# Patient Record
(demographics unavailable — no encounter records)

---

## 2024-10-14 NOTE — HISTORY OF PRESENT ILLNESS
[Neck] : neck [Lower back] : lower back [6] : 6 [0] : 0 [Burning] : burning [Intermittent] : intermittent [Household chores] : household chores [Nothing helps with pain getting better] : Nothing helps with pain getting better [Walking] : walking [Retired] : Work status: retired [FreeTextEntry1] : 10/14/2024 : Patient presents for initial evaluation. Patient with neck and back pain for the last year or so. Pain  has gotten worse recently.  Pian over the base of the neck and lower back without radiation. He is currently in PT. can not take NSAIDS due to plavix and Chrohns disease.     PmHx: Parkinson's Dz, Crohns s/p colectomy in 2020, Sleep disorder, HTN, CAD s/p stent placement 2023 on plavix and asa 81mg daily, HLD, BPH  Subjective Weakness: Yes-  strength  Numbness/Tingling: No Bladder/Bowel dysfunction: No Treatments Tried: PT Blood Thinners: Plavix, ASA   Attempted modalities for current pain complaint: See above: Medications: No   Injections: No   Previous Spine Surgery: N/A   Imaging: MRI Cervical Spine (7/3024) Zp: C2-C3: Mild spondylosis. Bilateral Luschka joint hypertrophy. No disc herniation, central canal, or foraminal stenosis.  C3-C4: Moderate to severe spondylosis with posterior disc space narrowing. Bilateral Luschka joint hypertrophy combines with a broad-based posterior central and left paracentral mixed spondylotic small disc protrusion that indents the anterior thecal sac. This produces a mild to minimal central canal stenosis. The disc material encroaches the floor of the left neural foramen producing a left foraminal stenosis. There is mild impingement of the exiting left C4 nerve root.  C4-C5: Moderate to severe spondylosis with posterior disc space narrowing. Broad-based posterior central mixed spondylotic small disc protrusion indents the anterior thecal sac. This produces a mild central canal stenosis. There is ----- Page Break ----- bilateral facet osteoarthrosis. No substantive neural foraminal stenosis.  C5-C6: Moderate spondylosis with posterior disc space narrowing. Bilateral Luschka joint hypertrophy combines with a broad-based posterior central small subligamentous disc protrusion that indents the anterior thecal sac. This produces a mild central canal stenosis. Vertebral endplate spur encroachment into the left neural foramen produces a left foraminal stenosis. There is impingement of the exiting left C6 nerve root.  C6-C7: Severe spondylosis with disc space narrowing and Modic type II degenerative endplate changes. Bilateral Luschka joint hypertrophy combines with a broad-based posterior central mixed spondylotic small disc protrusion that indents the anterior thecal sac. This produces a mild central canal stenosis. There is mild facet osteoarthrosis. Vertebral endplate spur encroachment into the neural foramina is noted bilaterally, worse on the left for a left greater than right neural foraminal stenosis.  C7-T1: Mild spondylosis. Bilateral Luschka joint hypertrophy combines with a broad-based posterior central small subligamentous disc protrusion that indents the anterior thecal sac. There is bilateral facet osteoarthrosis. No evidence MRI lumbar spine: 7/30/24: zp: Impingement of the descending left intrathecal L3 nerve root at L2-L3.  Impingement of the exiting left L3 nerve root at L3-L4.  Impingement of the bilateral exiting L4 nerve roots at L3-L4.  Multilevel central canal and neural foraminal stenosis on the basis of multilevel spondylosis, scoliosis, listhesis, facet osteoarthrosis, and degenerative disc bulging/protrusions as described and positioned above the  findings of which appear to be chronic in nature.   [] : no [de-identified] : L [facility]ST[facility] C MRI AT [facility]

## 2024-10-14 NOTE — PHYSICAL EXAM
[Rotation to left] : rotation to left [4___] : left finger abductors 4[unfilled]/5 [de-identified] : left lateral rotation 45 degrees [TWNoteComboBox6] : right lateral rotation 60 degrees [NL (90)] : forward flexion 90 degrees [Extension] : extension [] : negative Choe maneuver facet loading [de-identified] : extension 20 degrees

## 2024-10-14 NOTE — ASSESSMENT
[FreeTextEntry1] : After discussing various treatment options with the patient including but not limited to oral medications, physical therapy, exercise, modalities as well as interventional spinal injections, we have decided with the following plan:   1) Intervention Injection Therapy: I personally reviewed the MRI/CT scan images and agree with the radiologist's report. The radiological findings were discussed with the patient. The risks, benefits, contents and alternatives to injection were explained in full to the patient. Risks outlined include but are not limited to infection, sepsis, bleeding, post-dural puncture headache, nerve damage, temporary increase in pain, syncopal episode, failure to resolve symptoms, allergic reaction, symptom recurrence, and elevation of blood sugar in diabetics. Cortisone may cause immunosuppression. Patient understands the risks. All questions were answered. After discussion of options, patient requested an injection. Information regarding the injection was given to the patient. Which medications to stop prior to the injection was explained to the patient as well.   Follow up in 1-2 weeks post injection for re-evaluation. Continue Home exercises, stretching, activity modification, physical therapy, and conservative care.   Patient is presenting with acute/sub-acute radicular pain with impairment in ADLs and functionality.  The pain has not responded sufficiently to  conservative care including nsaid therapy and/or physical therapy.  There is no bleeding tendency, unstable medical condition, or systemic infection. The purpose of the spinal injections is to facilitate active therapy by providing short term relief through reduction of pain and inflammation.   Injections, by themselves, are not likely to provide long-term relief. Rather, active rehabilitation with modified work achieves long-term relief by increasing active ROM, strength and stability.   C7-T1 Cervical Epidural Steroid Injection under fluoroscopic guidance with image. Of note, the C7-T1 level has been determined to be the safest level to inject in the cervical spine as the epidural space is the largest. Despite pathology at different levels, studies have shown that the medication will spread up to the most cranial level, despite the level of injection.   LESI L5/S1

## 2024-11-11 NOTE — PROCEDURE
[FreeTextEntry3] : Date of Service: 11/11/2024   Account: [id number]   Patient: DAGOBERTO CONTI   YOB: 1946   Age: 78 year     Surgeon:                                                      Jacquelyn Angeles M.D.   Pre-Operative Diagnosis:                          Cervical Radiculopathy (M54.12)   Post Operative Diagnosis:                        Cervical Radiculopathy (M54.12)   Procedure:                                                  Interlaminar cervical epidural steroid injection (C7-T1) under fluoroscopic guidance   Anesthesia:                                                 None     This procedure was carried out using fluoroscopic guidance.  The risks and benefits of the procedure were discussed extensively with the patient.  The consent of the patient was obtained and the following procedure was performed.   The patient was placed in the prone position using a thoracic and chin support.  The cervical area was prepped and draped in a sterile fashion.  The fluoroscope visualized the C7-T1 interspace using slight cephalad-caudad angulation and this area was marked.  Using sterile technique the superficial skin was anesthetized with 1% Lidocaine without epinephrine.  A 18 gauge Tuohoy needle was advanced under fluoroscopy using [id number]-gznbm technique until ligament was engaged.  The stilette was then removed and a column of preservative free normal saline flushed throught the tuohoy needle and left with a concave fluid level above the butterfly portion of the tuohoy needle.  The needle was then advanced under fluoroscopic guidance until the column of saline disappeared.  Lateral view confirmed final needle tip placement in the epidural space.  After negative aspiration for heme and CSF, 1 cc of Omnipaque confirmed good cervical epiduragram.   Cervical epidurogram showed no evidence of intrathecal or intravascular flow, and good bilateral epidural flow from C3 to T2 levels.  An injectate of 6 cc of preservative free normal saline plus 12 mg of betamethasone was then injected into the epidural space. The needle was subsequently removed and pressure was applied.  The patient tolerated the procedure well and was instructed to contact me immediately if there were any problems.   Jacquelyn Angeles M.D.

## 2024-11-25 NOTE — PROCEDURE
[FreeTextEntry3] : Date of Service: 11/25/2024   Account: [id number]   Patient: DAGOBERTO CONTI   YOB: 1946   Age: 78 year     Surgeon:                                               Jacquelyn Angeles M.D.   Pre-Operative Diagnosis:                   Lumbosacral Radiculitis (M54.17)       Post Operative Diagnosis:                 Lumbosacral Radiculitis (M54.17)       Procedure:                                           Interlaminar lumbar epidural steroid injection (L5-S1) under fluoroscopic guidance   Anesthesia:                None     This procedure was carried out using fluoroscopic guidance.  The risks and benefits of the procedure were discussed extensively with the patient.  The consent of the patient was obtained and the following procedure was performed.   The patient was placed in the prone position.  The lumbar area was prepped and draped in a sterile fashion.  Under AP view with slight cephalad-caudad angulation, the L5-S1 interspace was identified and marked.  Using sterile technique the superficial skin was anesthetized with 1% Lidocaine without epinephrine.  A 20 gauge Tuohoy needle was advanced into the epidural space under fluoroscopy using pqlns-koyyqbvup-woofn technique and using loss of resistance at the L5-S1 level.  After negative aspiration for heme or CSF, an epidurogram was obtained using 3 cc Omnipaque contrast confirming epidural placement of the needle.    Epidurogram showed no evidence of intrathecal or intravascular flow, and good evidence of bilateral epidural flow from L3-S2 levels.  After this, 5 cc of preservative free normal saline and 80 mg of Kenalog were injected into the epidural space.   The needle was subsequently removed.     The patient tolerated the procedure well and was instructed to contact me immediately if there were any problems.   Jacquelyn Angeles M.D.

## 2024-12-12 NOTE — HISTORY OF PRESENT ILLNESS
[Neck] : neck [Lower back] : lower back [0] : 0 [Burning] : burning [Intermittent] : intermittent [Household chores] : household chores [Nothing helps with pain getting better] : Nothing helps with pain getting better [Walking] : walking [Retired] : Work status: retired [FreeTextEntry1] : 12/12/2024: follow up today for L5-S1 LESI on 11/25/24 with 20% relief.   Had 60% relief from RASHMI on 11/11.  Will start Lyrica  10/14/2024 : Patient presents for initial evaluation. Patient with neck and back pain for the last year or so. Pain  has gotten worse recently.  Pian over the base of the neck and lower back without radiation. He is currently in PT. can not take NSAIDS due to plavix and Chrohns disease.     PmHx: Parkinson's Dz, Crohns s/p colectomy in 2020, Sleep disorder, HTN, CAD s/p stent placement 2023 on plavix and asa 81mg daily, HLD, BPH  Subjective Weakness: Yes-  strength  Numbness/Tingling: No Bladder/Bowel dysfunction: No Treatments Tried: PT Blood Thinners: Plavix, ASA   Attempted modalities for current pain complaint: See above: Medications: No   Injections: Yes L5-S1 LESI - 11/25/24   Previous Spine Surgery: N/A   Imaging: MRI Cervical Spine (7/3024) Zp: C2-C3: Mild spondylosis. Bilateral Luschka joint hypertrophy. No disc herniation, central canal, or foraminal stenosis.  C3-C4: Moderate to severe spondylosis with posterior disc space narrowing. Bilateral Luschka joint hypertrophy combines with a broad-based posterior central and left paracentral mixed spondylotic small disc protrusion that indents the anterior thecal sac. This produces a mild to minimal central canal stenosis. The disc material encroaches the floor of the left neural foramen producing a left foraminal stenosis. There is mild impingement of the exiting left C4 nerve root.  C4-C5: Moderate to severe spondylosis with posterior disc space narrowing. Broad-based posterior central mixed spondylotic small disc protrusion indents the anterior thecal sac. This produces a mild central canal stenosis. There is ----- Page Break ----- bilateral facet osteoarthrosis. No substantive neural foraminal stenosis.  C5-C6: Moderate spondylosis with posterior disc space narrowing. Bilateral Luschka joint hypertrophy combines with a broad-based posterior central small subligamentous disc protrusion that indents the anterior thecal sac. This produces a mild central canal stenosis. Vertebral endplate spur encroachment into the left neural foramen produces a left foraminal stenosis. There is impingement of the exiting left C6 nerve root.  C6-C7: Severe spondylosis with disc space narrowing and Modic type II degenerative endplate changes. Bilateral Luschka joint hypertrophy combines with a broad-based posterior central mixed spondylotic small disc protrusion that indents the anterior thecal sac. This produces a mild central canal stenosis. There is mild facet osteoarthrosis. Vertebral endplate spur encroachment into the neural foramina is noted bilaterally, worse on the left for a left greater than right neural foraminal stenosis.  C7-T1: Mild spondylosis. Bilateral Luschka joint hypertrophy combines with a broad-based posterior central small subligamentous disc protrusion that indents the anterior thecal sac. There is bilateral facet osteoarthrosis. No evidence MRI lumbar spine: 7/30/24: zp: Impingement of the descending left intrathecal L3 nerve root at L2-L3.  Impingement of the exiting left L3 nerve root at L3-L4.  Impingement of the bilateral exiting L4 nerve roots at L3-L4.  Multilevel central canal and neural foraminal stenosis on the basis of multilevel spondylosis, scoliosis, listhesis, facet osteoarthrosis, and degenerative disc bulging/protrusions as described and positioned above the  findings of which appear to be chronic in nature.   [5] : 5 [] : no [de-identified] : L [facility]RISTY C MRI AT [facility]

## 2024-12-12 NOTE — ASSESSMENT
[FreeTextEntry1] : After discussing various treatment options with the patient including but not limited to oral medications, physical therapy, exercise, modalities as well as interventional spinal injections, we have decided with the following plan:   1) Intervention Injection Therapy: I personally reviewed the MRI/CT scan images and agree with the radiologist's report. The radiological findings were discussed with the patient. The risks, benefits, contents and alternatives to injection were explained in full to the patient. Risks outlined include but are not limited to infection, sepsis, bleeding, post-dural puncture headache, nerve damage, temporary increase in pain, syncopal episode, failure to resolve symptoms, allergic reaction, symptom recurrence, and elevation of blood sugar in diabetics. Cortisone may cause immunosuppression. Patient understands the risks. All questions were answered. After discussion of options, patient requested an injection. Information regarding the injection was given to the patient. Which medications to stop prior to the injection was explained to the patient as well.   Follow up in 1-2 weeks post injection for re-evaluation. Continue Home exercises, stretching, activity modification, physical therapy, and conservative care.   Patient is presenting with acute/sub-acute radicular pain with impairment in ADLs and functionality.  The pain has not responded sufficiently to  conservative care including nsaid therapy and/or physical therapy.  There is no bleeding tendency, unstable medical condition, or systemic infection. The purpose of the spinal injections is to facilitate active therapy by providing short term relief through reduction of pain and inflammation.   Injections, by themselves, are not likely to provide long-term relief. Rather, active rehabilitation with modified work achieves long-term relief by increasing active ROM, strength and stability.   C7-T1 Cervical Epidural Steroid Injection under fluoroscopic guidance with image. Of note, the C7-T1 level has been determined to be the safest level to inject in the cervical spine as the epidural space is the largest. Despite pathology at different levels, studies have shown that the medication will spread up to the most cranial level, despite the level of injection.   LESI L5/S1 - will call  I would recommend a trial of neuropathic medication as patient presents with signs of nerve irritation. (ie burning, paresthesias etc) Goals of therapy would be to improve pain and overall QOL. Side effects reviewed with patient. Patient will call or stop medication if given side effects occur.  Lyrica

## 2024-12-12 NOTE — PHYSICAL EXAM
[Rotation to left] : rotation to left [4___] : left finger abductors 4[unfilled]/5 [NL (90)] : forward flexion 90 degrees [Extension] : extension [de-identified] : left lateral rotation 45 degrees [TWNoteComboBox6] : right lateral rotation 60 degrees [] : negative Choe maneuver facet loading [de-identified] : extension 20 degrees

## 2025-02-25 NOTE — PROCEDURE
[FreeTextEntry3] : Date of Service: 02/25/2025   Account: [id number]   Patient: DAGOBERTO CONTI   YOB: 1946   Age: 79 year     Surgeon:                                               Jacquelyn Agneles M.D.   Pre-Operative Diagnosis:                   Lumbosacral Radiculitis (M54.17)       Post Operative Diagnosis:                 Lumbosacral Radiculitis (M54.17)       Procedure:                                           Interlaminar lumbar epidural steroid injection (L5-S1) under fluoroscopic guidance   Anesthesia:                None     This procedure was carried out using fluoroscopic guidance.  The risks and benefits of the procedure were discussed extensively with the patient.  The consent of the patient was obtained and the following procedure was performed.   The patient was placed in the prone position.  The lumbar area was prepped and draped in a sterile fashion.  Under AP view with slight cephalad-caudad angulation, the L5-S1 interspace was identified and marked.  Using sterile technique the superficial skin was anesthetized with 1% Lidocaine without epinephrine.  A 20 gauge Tuohoy needle was advanced into the epidural space under fluoroscopy using [id number]-[id number] technique and using loss of resistance at the L5-S1 level.  After negative aspiration for heme or CSF, an epidurogram was obtained using 3 cc Omnipaque contrast confirming epidural placement of the needle.    Epidurogram showed no evidence of intrathecal or intravascular flow, and good evidence of bilateral epidural flow from L3-S2 levels.  After this, 5 cc of preservative free normal saline and 80 mg of Kenalog were injected into the epidural space.   The needle was subsequently removed.     The patient tolerated the procedure well and was instructed to contact me immediately if there were any problems.   Jacquelyn Angeles M.D.

## 2025-03-13 NOTE — PHYSICAL EXAM
[de-identified] : left lateral rotation 45 degrees [TWNoteComboBox6] : right lateral rotation 60 degrees [] : negative Choe maneuver facet loading [de-identified] : extension 20 degrees